# Patient Record
Sex: FEMALE | Race: WHITE | Employment: FULL TIME | URBAN - METROPOLITAN AREA
[De-identification: names, ages, dates, MRNs, and addresses within clinical notes are randomized per-mention and may not be internally consistent; named-entity substitution may affect disease eponyms.]

---

## 2021-10-04 ENCOUNTER — HOSPITAL ENCOUNTER (EMERGENCY)
Age: 26
Discharge: HOME | End: 2021-10-04

## 2021-10-04 VITALS
DIASTOLIC BLOOD PRESSURE: 70 MMHG | BODY MASS INDEX: 32.14 KG/M2 | RESPIRATION RATE: 18 BRPM | HEART RATE: 71 BPM | TEMPERATURE: 98.1 F | HEIGHT: 66 IN | OXYGEN SATURATION: 100 % | WEIGHT: 200 LBS | SYSTOLIC BLOOD PRESSURE: 149 MMHG

## 2021-10-04 DIAGNOSIS — Z71.1 FEARED CONDITION NOT DEMONSTRATED: Primary | ICD-10-CM

## 2021-10-04 PROCEDURE — 99283 EMERGENCY DEPT VISIT LOW MDM: CPT

## 2021-10-04 ASSESSMENT — ENCOUNTER SYMPTOMS
VOMITING: 0
NAUSEA: 0
ABDOMINAL PAIN: 0
SHORTNESS OF BREATH: 0

## 2021-10-04 NOTE — ED PROVIDER NOTES
[facility]        Pt Name: Ingrid Cheung  MRN: [id number]  [place] 1995  Date of evaluation: 10/4/2021  Provider: Shawna Lefort  PCP: No primary care provider on file. Note Started: 1:37 AM EDT       ALEXANDER. I have evaluated this patient. My supervising physician was available for consultation. CHIEF COMPLAINT       Chief Complaint   Patient presents with    Other     pt states the top of a Qtip is in right ear and she is unable to get it out        HISTORY OF PRESENT ILLNESS   (Location, Timing/Onset, Context/Setting, Quality, Duration, Modifying Factors, Severity, Associated Signs and Symptoms)  Note limiting factors. Chief Complaint: q-tip in ear     Ingrid Cheung is a 31 y.o. female who presents emergency department for evaluation of Q-tip in her ear. Patient states he was using a Q-tip and when she pulled it out of her right ear she noted that there was no cotton remaining on the Q-tip stick. Patient's mom thought she saw the cotton in her ear and tried to remove it with another Q-tip but they were unable to get it out. Patient is not having any drainage from her ear, pain or hearing loss. Nursing Notes were all reviewed and agreed with or any disagreements were addressed in the HPI. REVIEW OF SYSTEMS    (2-9 systems for level 4, 10 or more for level 5)     Review of Systems   Constitutional: Negative for fatigue and fever. HENT: Negative. Ear foreign body   Eyes: Negative for visual disturbance. Respiratory: Negative for shortness of breath. Cardiovascular: Negative for chest pain. Gastrointestinal: Negative for abdominal pain, nausea and vomiting. Genitourinary: Negative. Musculoskeletal: Negative. Skin: Negative. Neurological: Negative. Positives and Pertinent negatives as per HPI. Except as noted above in the ROS, all other systems were reviewed and negative. significant injury noted. Left TM and canal are also within normal limits and unobstructed. Unsure if the foreign body fell out at home but was never present in the first place however there is no evidence of retained foreign body at this time. Patient is discharged home. She was encouraged not to insert any objects into her ear canal in the future. Patient is amenable to outpatient plan will be discharged home. At this time I have low concern for otitis media, otitis externa, retained foreign body, TM rupture or other acute process of require further management  FINAL IMPRESSION      1. Feared condition not demonstrated          DISPOSITION/PLAN   DISPOSITION Decision To Discharge 10/04/2021 01:12:36 AM      PATIENT REFERRED TO:  [facility] Emergency Department  [address] [facility]  [phone]    If symptoms worsen      DISCHARGE MEDICATIONS:  There are no discharge medications for this patient. DISCONTINUED MEDICATIONS:  There are no discharge medications for this patient.              (Please note that portions of this note were completed with a voice recognition program.  Efforts were made to edit the dictations but occasionally words are mis-transcribed.)    Shama Zaidi PA-C (electronically signed)            Shama Zaidi PA-C  10/04/21 0222